# Patient Record
Sex: FEMALE | Race: WHITE | NOT HISPANIC OR LATINO
[De-identification: names, ages, dates, MRNs, and addresses within clinical notes are randomized per-mention and may not be internally consistent; named-entity substitution may affect disease eponyms.]

---

## 2018-09-24 PROBLEM — Z00.00 ENCOUNTER FOR PREVENTIVE HEALTH EXAMINATION: Status: ACTIVE | Noted: 2018-09-24

## 2018-10-09 ENCOUNTER — APPOINTMENT (OUTPATIENT)
Dept: SURGERY | Facility: CLINIC | Age: 38
End: 2018-10-09
Payer: COMMERCIAL

## 2018-10-09 VITALS — BODY MASS INDEX: 23.05 KG/M2 | HEIGHT: 64 IN | WEIGHT: 135 LBS

## 2018-10-09 PROCEDURE — 99214 OFFICE O/P EST MOD 30 MIN: CPT

## 2018-10-23 ENCOUNTER — TRANSCRIPTION ENCOUNTER (OUTPATIENT)
Age: 38
End: 2018-10-23

## 2018-11-09 ENCOUNTER — APPOINTMENT (OUTPATIENT)
Dept: SURGERY | Facility: HOSPITAL | Age: 38
End: 2018-11-09
Payer: COMMERCIAL

## 2018-11-09 ENCOUNTER — RESULT REVIEW (OUTPATIENT)
Age: 38
End: 2018-11-09

## 2018-11-09 ENCOUNTER — OUTPATIENT (OUTPATIENT)
Dept: OUTPATIENT SERVICES | Facility: HOSPITAL | Age: 38
LOS: 1 days | Discharge: HOME | End: 2018-11-09

## 2018-11-09 VITALS
OXYGEN SATURATION: 100 % | SYSTOLIC BLOOD PRESSURE: 134 MMHG | HEART RATE: 75 BPM | RESPIRATION RATE: 20 BRPM | HEIGHT: 64 IN | WEIGHT: 134.92 LBS | TEMPERATURE: 98 F | DIASTOLIC BLOOD PRESSURE: 87 MMHG

## 2018-11-09 VITALS
DIASTOLIC BLOOD PRESSURE: 88 MMHG | OXYGEN SATURATION: 100 % | HEART RATE: 54 BPM | RESPIRATION RATE: 20 BRPM | SYSTOLIC BLOOD PRESSURE: 133 MMHG

## 2018-11-09 DIAGNOSIS — Z98.890 OTHER SPECIFIED POSTPROCEDURAL STATES: Chronic | ICD-10-CM

## 2018-11-09 PROCEDURE — 64714 REVISE LOW BACK NERVE(S): CPT

## 2018-11-09 PROCEDURE — 49520 REREPAIR ING HERNIA REDUCE: CPT

## 2018-11-09 RX ORDER — OXYCODONE AND ACETAMINOPHEN 5; 325 MG/1; MG/1
2 TABLET ORAL EVERY 6 HOURS
Qty: 0 | Refills: 0 | Status: DISCONTINUED | OUTPATIENT
Start: 2018-11-09 | End: 2018-11-09

## 2018-11-09 RX ORDER — HYDROMORPHONE HYDROCHLORIDE 2 MG/ML
0.5 INJECTION INTRAMUSCULAR; INTRAVENOUS; SUBCUTANEOUS
Qty: 0 | Refills: 0 | Status: DISCONTINUED | OUTPATIENT
Start: 2018-11-09 | End: 2018-11-09

## 2018-11-09 RX ORDER — ONDANSETRON 8 MG/1
4 TABLET, FILM COATED ORAL ONCE
Qty: 0 | Refills: 0 | Status: DISCONTINUED | OUTPATIENT
Start: 2018-11-09 | End: 2018-11-24

## 2018-11-09 RX ORDER — TRAMADOL HYDROCHLORIDE 50 MG/1
1 TABLET ORAL
Qty: 30 | Refills: 0
Start: 2018-11-09 | End: 2018-11-13

## 2018-11-09 RX ORDER — SODIUM CHLORIDE 9 MG/ML
1000 INJECTION, SOLUTION INTRAVENOUS
Qty: 0 | Refills: 0 | Status: DISCONTINUED | OUTPATIENT
Start: 2018-11-09 | End: 2018-11-24

## 2018-11-09 RX ADMIN — SODIUM CHLORIDE 100 MILLILITER(S): 9 INJECTION, SOLUTION INTRAVENOUS at 14:01

## 2018-11-09 NOTE — BRIEF OPERATIVE NOTE - PROCEDURE
<<-----Click on this checkbox to enter Procedure Repair of recurrent reducible inguinal hernia using mesh  11/09/2018  Right Groin Exploration, Removal of Old Mesh  Active  Gregor Cid

## 2018-11-09 NOTE — CHART NOTE - NSCHARTNOTEFT_GEN_A_CORE
PACU ANESTHESIA PACU ADMISSION NOTE      Procedure:Repair of recurrent reducible inguinal hernia using mesh: Right Groin Exploration, Removal of Old Mesh    Post op diagnosisRecurrent right inguinal hernia      ____ Intubated  TV:______       Rate: ______      FiO2: ______    ___x_ Patent Airway    ____ Full return of protective reflexes    __x__ Full recovery from anesthesia / sedation to baseline status    Viitals:  see anesthesia record          Mental Status:  __x__ Awake   _____ Alert   _____ Drowsy   _____ Sedated    Nausea/Vomiting: ____ Yes, See Post - Op Orders      __x__ No    Pain Scale (0-10): _____    Treatment: ____ None    ___x_ See Post - Op/PCA Orders    Post - Operative Fluids:   ____ Oral   ___x_ See Post - Op Orders    Plan:         Discharge:   __x__Home       _____Floor         _____Critical Care    _____Other:_________________    Comments:  uneventful perioperative course

## 2018-11-14 DIAGNOSIS — Z88.2 ALLERGY STATUS TO SULFONAMIDES: ICD-10-CM

## 2018-11-14 DIAGNOSIS — K40.91 UNILATERAL INGUINAL HERNIA, WITHOUT OBSTRUCTION OR GANGRENE, RECURRENT: ICD-10-CM

## 2018-11-16 LAB — SURGICAL PATHOLOGY STUDY: SIGNIFICANT CHANGE UP

## 2018-11-20 ENCOUNTER — APPOINTMENT (OUTPATIENT)
Dept: SURGERY | Facility: CLINIC | Age: 38
End: 2018-11-20
Payer: COMMERCIAL

## 2018-11-20 PROCEDURE — 99024 POSTOP FOLLOW-UP VISIT: CPT

## 2019-04-22 ENCOUNTER — TRANSCRIPTION ENCOUNTER (OUTPATIENT)
Age: 39
End: 2019-04-22

## 2019-09-12 ENCOUNTER — APPOINTMENT (OUTPATIENT)
Dept: SURGERY | Facility: CLINIC | Age: 39
End: 2019-09-12
Payer: COMMERCIAL

## 2019-09-12 VITALS — BODY MASS INDEX: 25.06 KG/M2 | WEIGHT: 146 LBS

## 2019-09-12 VITALS — WEIGHT: 146 LBS | HEIGHT: 64 IN | BODY MASS INDEX: 24.92 KG/M2

## 2019-09-12 DIAGNOSIS — K40.91 UNILATERAL INGUINAL HERNIA, W/OUT OBSTRUCTION OR GANGRENE, RECURRENT: ICD-10-CM

## 2019-09-12 PROCEDURE — 99214 OFFICE O/P EST MOD 30 MIN: CPT

## 2019-09-12 NOTE — ASSESSMENT
[FreeTextEntry1] : Jennifer is a pleasant 38-year-old  with no significant past medical history other than a left inguinal hernia repair with mesh by me in 2015 followed by a right inguinal hernia repair with mesh by me in 2016 who presented back to me in 2018 with chronic right groin pain who underwent right groin exploration, removal of old mesh, neurectomy and re-repair of recurrent right inguinal hernia with mesh in November 2018 who was doing well until 2 months ago and she began experiencing pain and discomfort in the right groin causing her significant concern. She presents back today for reevaluation.\par \par Physical examination demonstrates a well-healed scar in both groins with no evidence of hernia recurrence or delayed wound complications in either groin. She does have significant tenderness and guarding in the right groin and moderate tenderness in the left groin. She appears also to be tender through most of the lower abdomen and up into the right upper quadrant as well. She denies any gastrointestinal symptoms. Her umbilical examination is unremarkable. Her current BMI is 25.\par \par Jennifer was counseled and reassured. In light of her significant symptoms and unremarkable physical examination, I ordered a CT scan of the abdomen and pelvis without IV contrast to be done in the near future. I will call the patient with the results once I receive the report and review the images personally.  Further management will be determined at that time.  The patient was encouraged to avoid heavy lifting and strenuous activity in the interim, of course.\par \par If no recurrent hernia or significant pathology to explain her symptoms is found, I do believe she may benefit from a pain management physician which is not happy with the one I sent her to several years ago and she was recommended to find one close to her who she likes and who may help her.

## 2019-09-12 NOTE — PHYSICAL EXAM
[Normal Breath Sounds] : Normal breath sounds [No Rash or Lesion] : No rash or lesion [Alert] : alert [Anxious] : anxious [JVD] : no jugular venous distention  [de-identified] : healthy [de-identified] : normal [de-identified] : soft and flat abdomen [de-identified] : no hernia recurrence

## 2019-09-12 NOTE — REVIEW OF SYSTEMS
SURGERY SCHEDULING REQUIREMENTS INCLUDE:  Facility: [facility]  Admission Type: Day Surgery   Time Needed: 45 minutes  Anesthesia: IV Sedation  NPO: Yes   IV: yes  Special Equipment: None   Procedure: left L3-L5 RFA  Dx/CPT CODE: 60852 [id number] M47.817  Anticoagulation:   Is the patient on Coumadin/Warfarin, Lovenox, Plavix, or Aspirin/Excedrin? No holding instructions  Diabetic: No  Pacemaker: No  Stroke/MI in the past 6 months: No  There is no height or weight on file to calculate BMI.  Special Instructions: Under Fluoroscopy        [Negative] : Heme/Lymph

## 2019-09-12 NOTE — CONSULT LETTER
[FreeTextEntry1] : Dear Dr. Lottie River, \par \par I had the pleasure of seeing your patient, LOTTIE OG, in my office today. Please see my note below. \par \par Thank you very much for allowing me to participate in the care of this patient. If you have any questions, please do not hesitate to contact me. \par \par \par Respectfully,\par \par Gregor Cid M.D., FACS. \par \par

## 2019-11-18 ENCOUNTER — APPOINTMENT (OUTPATIENT)
Dept: SURGERY | Facility: AMBULATORY SURGERY CENTER | Age: 39
End: 2019-11-18
Payer: COMMERCIAL

## 2019-11-18 ENCOUNTER — OUTPATIENT (OUTPATIENT)
Dept: OUTPATIENT SERVICES | Facility: HOSPITAL | Age: 39
LOS: 1 days | Discharge: HOME | End: 2019-11-18

## 2019-11-18 VITALS
RESPIRATION RATE: 18 BRPM | SYSTOLIC BLOOD PRESSURE: 133 MMHG | HEIGHT: 64 IN | OXYGEN SATURATION: 100 % | TEMPERATURE: 99 F | HEART RATE: 74 BPM | WEIGHT: 145.06 LBS | DIASTOLIC BLOOD PRESSURE: 76 MMHG

## 2019-11-18 VITALS
OXYGEN SATURATION: 100 % | HEART RATE: 62 BPM | SYSTOLIC BLOOD PRESSURE: 125 MMHG | DIASTOLIC BLOOD PRESSURE: 84 MMHG | RESPIRATION RATE: 18 BRPM

## 2019-11-18 DIAGNOSIS — Z98.890 OTHER SPECIFIED POSTPROCEDURAL STATES: Chronic | ICD-10-CM

## 2019-11-18 PROCEDURE — 49585: CPT | Mod: XS

## 2019-11-18 PROCEDURE — 49568: CPT | Mod: 59

## 2019-11-18 PROCEDURE — 49560: CPT

## 2019-11-18 RX ORDER — MORPHINE SULFATE 50 MG/1
2 CAPSULE, EXTENDED RELEASE ORAL
Refills: 0 | Status: DISCONTINUED | OUTPATIENT
Start: 2019-11-18 | End: 2019-11-18

## 2019-11-18 RX ORDER — OXYCODONE AND ACETAMINOPHEN 5; 325 MG/1; MG/1
1 TABLET ORAL EVERY 4 HOURS
Refills: 0 | Status: DISCONTINUED | OUTPATIENT
Start: 2019-11-18 | End: 2019-11-18

## 2019-11-18 RX ORDER — NORETHINDRONE AND ETHINYL ESTRADIOL 0.4-0.035
1 KIT ORAL
Qty: 0 | Refills: 0 | DISCHARGE

## 2019-11-18 RX ORDER — TRAMADOL HYDROCHLORIDE 50 MG/1
1 TABLET ORAL
Qty: 30 | Refills: 0
Start: 2019-11-18 | End: 2019-11-22

## 2019-11-18 RX ORDER — SODIUM CHLORIDE 9 MG/ML
1000 INJECTION, SOLUTION INTRAVENOUS
Refills: 0 | Status: DISCONTINUED | OUTPATIENT
Start: 2019-11-18 | End: 2019-12-18

## 2019-11-18 RX ORDER — ONDANSETRON 8 MG/1
4 TABLET, FILM COATED ORAL ONCE
Refills: 0 | Status: DISCONTINUED | OUTPATIENT
Start: 2019-11-18 | End: 2019-12-18

## 2019-11-18 NOTE — BRIEF OPERATIVE NOTE - NSICDXBRIEFPROCEDURE_GEN_ALL_CORE_FT
PROCEDURES:  Repair of ventral hernia with mesh 18-Nov-2019 13:53:43  Gregor Cid  Umbilical hernia repair with mesh 18-Nov-2019 13:53:41  Gregor Cid

## 2019-11-18 NOTE — ASU DISCHARGE PLAN (ADULT/PEDIATRIC) - CARE PROVIDER_API CALL
Gregor Cid)  Surgery  [address] [facility], [facility] [address]  [place] [address]  Phone: [phone]  Fax: [phone]  Scheduled Appointment: 11/26/2019

## 2019-11-18 NOTE — BRIEF OPERATIVE NOTE - NSICDXBRIEFPOSTOP_GEN_ALL_CORE_FT
POST-OP DIAGNOSIS:  Umbilical hernia 18-Nov-2019 13:53:37  Gregor Cid  Ventral hernia 18-Nov-2019 13:53:36  Gregor Cid

## 2019-11-21 DIAGNOSIS — Z88.2 ALLERGY STATUS TO SULFONAMIDES: ICD-10-CM

## 2019-11-21 DIAGNOSIS — K43.9 VENTRAL HERNIA WITHOUT OBSTRUCTION OR GANGRENE: ICD-10-CM

## 2019-11-21 DIAGNOSIS — K42.9 UMBILICAL HERNIA WITHOUT OBSTRUCTION OR GANGRENE: ICD-10-CM

## 2019-11-26 ENCOUNTER — APPOINTMENT (OUTPATIENT)
Dept: SURGERY | Facility: CLINIC | Age: 39
End: 2019-11-26
Payer: COMMERCIAL

## 2019-11-26 DIAGNOSIS — K42.9 UMBILICAL HERNIA W/OUT OBSTRUCTION OR GANGRENE: ICD-10-CM

## 2019-11-26 DIAGNOSIS — K43.9 VENTRAL HERNIA W/OUT OBSTRUCTION OR GANGRENE: ICD-10-CM

## 2019-11-26 DIAGNOSIS — R10.31 RIGHT LOWER QUADRANT PAIN: ICD-10-CM

## 2019-11-26 PROCEDURE — 99024 POSTOP FOLLOW-UP VISIT: CPT

## 2019-11-26 NOTE — CONSULT LETTER
[FreeTextEntry1] : \par Dear Dr. Lottie River, \par \par I had the pleasure of seeing your patient, LOTTIE OG, in my office today. Please see my note below. \par \par Thank you very much for allowing me to participate in the care of this patient. If you have any questions, please do not hesitate to contact me. \par \par \par Respectfully,\par \par Gregor Cid M.D., FACS. \par

## 2019-11-26 NOTE — ASSESSMENT
[FreeTextEntry1] : Magdalena underwent the repair of her supraumbilical ventral hernia with mesh and repair of her umbilical hernia with mesh on November 18, 2019 under local with IV sedation without any problems or complications. Her wound is clean, dry and intact. There is no evidence of erythema, seroma formation or infection. She is tolerating a diet and having normal bowel movements. She denies any significant postoperative pain or discomfort at this time.\par \par She was counseled and reassured. She was discharged from the office with no specific followup necessary, but she knows to avoid any heavy lifting or strenuous activity for the next several weeks. The patient was encouraged to continue to wear an abdominal binder for the better part of the next month.

## 2021-01-11 NOTE — PACU DISCHARGE NOTE - PAIN:
Controlled with current regime SSKI Counseling:  I discussed with the patient the risks of SSKI including but not limited to thyroid abnormalities, metallic taste, GI upset, fever, headache, acne, arthralgias, paraesthesias, lymphadenopathy, easy bleeding, arrhythmias, and allergic reaction.

## 2023-06-23 NOTE — BRIEF OPERATIVE NOTE - VENOUS THROMBOEMBOLISM PROPHYLAXIS THERAPY
Please let patient  Her cholesterol  is elevated  Advise to watch her diet  Recheck in 6 months fastiet
Please let patient know  Her electrolytes,kidney test and blood sugar are normal  Her liver test is slightly above normal  If she drinks alcohol advise to minimize alcohol drink  Recheck in 6 months
venodynes

## 2025-05-06 NOTE — ASU DISCHARGE PLAN (ADULT/PEDIATRIC). - ACTIVITY LEVEL
"Subjective     Patient ID: Tona Zelaya is a 47 y.o. female.    Chief Complaint:  frequent periods      History of Present Illness  HPI  47 y.o.  presents with c/o frequent cycles.  Up until February she had been having normal, monthly cycles.  Then started coming every 2 wks (, 3/10, 3/26, ).   She did not have a cycle in April, but felt cramping like she was about to have her cycle.  Also has increased headaches, usually around the first day of her cycle.  Feels tired/fatigued first day as well.  No CP/SOB.  No HF/NS.  Has never been sexually active.  Last pap a few years ago.  No other complaints today.  Currently on her cycle today.     GYN & OB History  Patient's last menstrual period was 2025.   Date of Last Pap: No result found    OB History    Para Term  AB Living   0 0 0      SAB IAB Ectopic Multiple Live Births              Review of Systems  Review of Systems   Constitutional:  Negative for chills and fever.   Respiratory:  Negative for shortness of breath.    Cardiovascular:  Negative for chest pain.   Gastrointestinal:  Negative for abdominal pain, constipation and diarrhea.   Genitourinary:  Positive for menstrual problem. Negative for pelvic pain and vaginal discharge.   Musculoskeletal:  Negative for myalgias.   Neurological:  Positive for headaches.          Objective   Physical Exam    BP (!) 134/98   Ht 5' 4" (1.626 m)   Wt 133.6 kg (294 lb 8.6 oz)   LMP 2025   BMI 50.56 kg/m²     Gen: NAD  Resp: Normal respiratory effort  Pelvic: deferred  Ext: normal ROM  Psych: appropriate affect  Neuro: grossly intact         Assessment and Plan     Tona was seen today for frequent periods.    Diagnoses and all orders for this visit:    Abnormal uterine bleeding (AUB)  -     TSH; Future  -     ESTRADIOL; Future  -     TESTOSTERONE; Future  -     FOLLICLE STIMULATING HORMONE; Future  -     PROLACTIN; Future  -     CBC W/ AUTO DIFFERENTIAL; Future  -     US " Pelvis Comp with Transvag NON-OB (xpd; Future    Frequent headaches  -     Ambulatory referral/consult to Neurology; Future          Plan:  Discussed bleeding/cycles with patient. Would recommend work up with hormone labs, CBC, and pelvic US.   Also discussed EMB to rule out endometrial pathology.  Recommend pre-treatment with 800mg ibuprofen.   Discussed treatment options based on results (medical vs D&C if polyps vs expectant management).   Neurology referral due to frequent HA, although may be more cycle-related.   Counseling done, precautions given, all questions answered.  RTC for annual and EMB, as well as results f/u.           no heavy lifting/no sports/gym/no exercise